# Patient Record
Sex: FEMALE | Race: WHITE | NOT HISPANIC OR LATINO | Employment: UNEMPLOYED | URBAN - METROPOLITAN AREA
[De-identification: names, ages, dates, MRNs, and addresses within clinical notes are randomized per-mention and may not be internally consistent; named-entity substitution may affect disease eponyms.]

---
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
## 2019-10-09 PROBLEM — O36.8190 DECREASED FETAL MOVEMENT: Status: ACTIVE | Noted: 2019-10-09

## 2019-10-27 PROBLEM — O26.90 PREGNANCY COMPLICATION: Status: ACTIVE | Noted: 2019-10-27

## 2019-11-09 PROBLEM — R10.9 ABDOMINAL PAIN DURING PREGNANCY: Status: ACTIVE | Noted: 2019-11-09

## 2019-11-09 PROBLEM — O26.899 ABDOMINAL PAIN DURING PREGNANCY: Status: ACTIVE | Noted: 2019-11-09

## 2019-11-22 VITALS
TEMPERATURE: 98 F | HEART RATE: 84 BPM | OXYGEN SATURATION: 97 % | RESPIRATION RATE: 18 BRPM | DIASTOLIC BLOOD PRESSURE: 57 MMHG | SYSTOLIC BLOOD PRESSURE: 105 MMHG

## 2019-11-22 PROBLEM — R50.9 FEVER: Status: ACTIVE | Noted: 2019-11-22

## 2019-11-23 VITALS
SYSTOLIC BLOOD PRESSURE: 101 MMHG | RESPIRATION RATE: 15 BRPM | TEMPERATURE: 99 F | OXYGEN SATURATION: 96 % | DIASTOLIC BLOOD PRESSURE: 55 MMHG | HEART RATE: 94 BPM

## 2019-11-24 VITALS
DIASTOLIC BLOOD PRESSURE: 58 MMHG | OXYGEN SATURATION: 98 % | RESPIRATION RATE: 14 BRPM | SYSTOLIC BLOOD PRESSURE: 92 MMHG | TEMPERATURE: 98 F | HEART RATE: 66 BPM

## 2019-11-25 VITALS
SYSTOLIC BLOOD PRESSURE: 85 MMHG | RESPIRATION RATE: 18 BRPM | TEMPERATURE: 98 F | DIASTOLIC BLOOD PRESSURE: 59 MMHG | OXYGEN SATURATION: 99 % | HEART RATE: 87 BPM

## 2019-11-25 PROBLEM — R50.9 FEVER: Status: RESOLVED | Noted: 2019-11-22 | Resolved: 2019-11-25

## 2019-12-27 PROBLEM — N89.8 VAGINAL DISCHARGE DURING PREGNANCY: Status: ACTIVE | Noted: 2019-12-27

## 2019-12-27 PROBLEM — O26.899 VAGINAL DISCHARGE DURING PREGNANCY: Status: ACTIVE | Noted: 2019-12-27

## 2019-12-29 PROBLEM — O42.90 PROM (PREMATURE RUPTURE OF MEMBRANES): Status: ACTIVE | Noted: 2019-12-27

## 2020-05-26 PROBLEM — R10.9 ABDOMINAL PAIN DURING PREGNANCY: Status: RESOLVED | Noted: 2019-11-09 | Resolved: 2020-05-26

## 2020-05-26 PROBLEM — F43.21 SITUATIONAL DEPRESSION: Status: RESOLVED | Noted: 2017-09-27 | Resolved: 2020-05-26

## 2020-05-26 PROBLEM — O26.899 ABDOMINAL PAIN DURING PREGNANCY: Status: RESOLVED | Noted: 2019-11-09 | Resolved: 2020-05-26

## 2020-05-26 PROBLEM — O26.90 PREGNANCY COMPLICATION: Status: RESOLVED | Noted: 2019-10-27 | Resolved: 2020-05-26

## 2020-05-26 PROBLEM — O42.90 PROM (PREMATURE RUPTURE OF MEMBRANES): Status: RESOLVED | Noted: 2019-12-27 | Resolved: 2020-05-26

## 2020-05-26 PROBLEM — O36.8190 DECREASED FETAL MOVEMENT: Status: RESOLVED | Noted: 2019-10-09 | Resolved: 2020-05-26

## 2020-12-17 PROBLEM — N76.6 ULCERATION OF VULVA: Status: ACTIVE | Noted: 2019-06-24

## 2020-12-17 PROBLEM — A56.8 CHLAMYDIA TRACHOMATIS INFECTION IN PREGNANCY: Status: ACTIVE | Noted: 2019-06-10

## 2020-12-17 PROBLEM — O98.319 CHLAMYDIA TRACHOMATIS INFECTION IN PREGNANCY: Status: ACTIVE | Noted: 2019-06-10

## 2020-12-17 PROBLEM — O99.810 ABNORMAL GLUCOSE TOLERANCE TEST (GTT) DURING PREGNANCY, ANTEPARTUM: Status: ACTIVE | Noted: 2019-10-29

## 2020-12-17 PROBLEM — Z3A.08 8 WEEKS GESTATION OF PREGNANCY: Status: ACTIVE | Noted: 2020-12-17

## 2020-12-17 PROBLEM — Z98.891 HISTORY OF CESAREAN SECTION: Status: ACTIVE | Noted: 2020-01-06

## 2020-12-17 PROBLEM — O99.019 ANEMIA OF PREGNANCY: Status: ACTIVE | Noted: 2019-10-29

## 2020-12-17 PROBLEM — M62.89 PELVIC FLOOR DYSFUNCTION: Status: ACTIVE | Noted: 2019-08-19

## 2020-12-17 PROBLEM — Z34.90 PREGNANT: Status: ACTIVE | Noted: 2019-05-21

## 2021-04-21 DIAGNOSIS — O36.8390 FETAL ARRHYTHMIA AFFECTING PREGNANCY, ANTEPARTUM: Primary | ICD-10-CM

## 2021-05-03 ENCOUNTER — CLINICAL SUPPORT (OUTPATIENT)
Dept: PEDIATRIC CARDIOLOGY | Facility: CLINIC | Age: 21
End: 2021-05-03
Payer: OTHER GOVERNMENT

## 2021-05-03 ENCOUNTER — OFFICE VISIT (OUTPATIENT)
Dept: PEDIATRIC CARDIOLOGY | Facility: CLINIC | Age: 21
End: 2021-05-03
Payer: OTHER GOVERNMENT

## 2021-05-03 VITALS
HEIGHT: 63 IN | HEART RATE: 86 BPM | BODY MASS INDEX: 23.63 KG/M2 | WEIGHT: 133.38 LBS | DIASTOLIC BLOOD PRESSURE: 62 MMHG | SYSTOLIC BLOOD PRESSURE: 100 MMHG | OXYGEN SATURATION: 99 %

## 2021-05-03 DIAGNOSIS — Z03.73 FETAL ANOMALY SUSPECTED BUT NOT FOUND: Primary | ICD-10-CM

## 2021-05-03 DIAGNOSIS — O36.8390 FETAL ARRHYTHMIA AFFECTING PREGNANCY, ANTEPARTUM: ICD-10-CM

## 2021-05-03 PROCEDURE — 76825 PR  SO2 FETAL HEART: ICD-10-PCS | Mod: 26,S$PBB,, | Performed by: PEDIATRICS

## 2021-05-03 PROCEDURE — 99203 OFFICE O/P NEW LOW 30 MIN: CPT | Mod: 25,S$PBB,, | Performed by: PEDIATRICS

## 2021-05-03 PROCEDURE — 93325 DOPPLER ECHO COLOR FLOW MAPG: CPT | Mod: PBBFAC,PO | Performed by: PEDIATRICS

## 2021-05-03 PROCEDURE — 99999 PR PBB SHADOW E&M-EST. PATIENT-LVL III: ICD-10-PCS | Mod: PBBFAC,,, | Performed by: PEDIATRICS

## 2021-05-03 PROCEDURE — 76825 ECHO EXAM OF FETAL HEART: CPT | Mod: 26,S$PBB,, | Performed by: PEDIATRICS

## 2021-05-03 PROCEDURE — 76827 PR  SO2 FETAL HEART DOPPLER: ICD-10-PCS | Mod: 26,S$PBB,, | Performed by: PEDIATRICS

## 2021-05-03 PROCEDURE — 76827 ECHO EXAM OF FETAL HEART: CPT | Mod: PBBFAC,PO | Performed by: PEDIATRICS

## 2021-05-03 PROCEDURE — 99211 OFF/OP EST MAY X REQ PHY/QHP: CPT | Mod: PBBFAC,27,PO

## 2021-05-03 PROCEDURE — 99999 PR PBB SHADOW E&M-EST. PATIENT-LVL III: CPT | Mod: PBBFAC,,, | Performed by: PEDIATRICS

## 2021-05-03 PROCEDURE — 76827 ECHO EXAM OF FETAL HEART: CPT | Mod: 26,S$PBB,, | Performed by: PEDIATRICS

## 2021-05-03 PROCEDURE — 99999 PR PBB SHADOW E&M-EST. PATIENT-LVL I: ICD-10-PCS | Mod: PBBFAC,,,

## 2021-05-03 PROCEDURE — 99213 OFFICE O/P EST LOW 20 MIN: CPT | Mod: PBBFAC,PO | Performed by: PEDIATRICS

## 2021-05-03 PROCEDURE — 99999 PR PBB SHADOW E&M-EST. PATIENT-LVL I: CPT | Mod: PBBFAC,,,

## 2021-05-03 PROCEDURE — 93325 PR DOPPLER COLOR FLOW VELOCITY MAP: ICD-10-PCS | Mod: 26,S$PBB,, | Performed by: PEDIATRICS

## 2021-05-03 PROCEDURE — 99203 PR OFFICE/OUTPT VISIT, NEW, LEVL III, 30-44 MIN: ICD-10-PCS | Mod: 25,S$PBB,, | Performed by: PEDIATRICS

## 2021-05-03 PROCEDURE — 93325 DOPPLER ECHO COLOR FLOW MAPG: CPT | Mod: 26,S$PBB,, | Performed by: PEDIATRICS

## 2021-05-03 PROCEDURE — 76825 ECHO EXAM OF FETAL HEART: CPT | Mod: PBBFAC,PO | Performed by: PEDIATRICS

## 2021-05-10 ENCOUNTER — PATIENT MESSAGE (OUTPATIENT)
Dept: RESEARCH | Facility: HOSPITAL | Age: 21
End: 2021-05-10

## 2021-06-15 PROBLEM — E16.2 LOW BLOOD GLUCOSE MEASUREMENT: Status: ACTIVE | Noted: 2021-06-15

## 2021-06-23 PROBLEM — O47.9 IRREGULAR UTERINE CONTRACTIONS: Status: ACTIVE | Noted: 2021-06-23

## 2021-07-20 PROBLEM — O36.8190 DECREASED FETAL MOVEMENT: Status: ACTIVE | Noted: 2021-07-20

## 2021-11-29 PROBLEM — Z3A.08 8 WEEKS GESTATION OF PREGNANCY: Status: RESOLVED | Noted: 2020-12-17 | Resolved: 2021-11-29

## 2023-03-25 PROBLEM — O36.8190 DECREASED FETAL MOVEMENT: Status: RESOLVED | Noted: 2021-07-20 | Resolved: 2023-03-25

## 2023-03-25 PROBLEM — Z34.90 PREGNANT: Status: RESOLVED | Noted: 2019-05-21 | Resolved: 2023-03-25

## 2023-03-25 PROBLEM — Z98.891 HISTORY OF CESAREAN SECTION: Status: RESOLVED | Noted: 2020-01-06 | Resolved: 2023-03-25

## 2023-03-25 PROBLEM — O47.9 IRREGULAR UTERINE CONTRACTIONS: Status: RESOLVED | Noted: 2021-06-23 | Resolved: 2023-03-25

## 2023-03-25 PROBLEM — A56.8 CHLAMYDIA TRACHOMATIS INFECTION IN PREGNANCY: Status: RESOLVED | Noted: 2019-06-10 | Resolved: 2023-03-25

## 2023-03-25 PROBLEM — N76.6 ULCERATION OF VULVA: Status: RESOLVED | Noted: 2019-06-24 | Resolved: 2023-03-25

## 2023-03-25 PROBLEM — O99.810 ABNORMAL GLUCOSE TOLERANCE TEST (GTT) DURING PREGNANCY, ANTEPARTUM: Status: RESOLVED | Noted: 2019-10-29 | Resolved: 2023-03-25

## 2023-03-25 PROBLEM — O34.211 MATERNAL CARE DUE TO LOW TRANSVERSE UTERINE SCAR FROM PREVIOUS CESAREAN DELIVERY: Status: ACTIVE | Noted: 2023-03-25

## 2023-03-25 PROBLEM — O98.319 CHLAMYDIA TRACHOMATIS INFECTION IN PREGNANCY: Status: RESOLVED | Noted: 2019-06-10 | Resolved: 2023-03-25

## 2023-03-25 PROBLEM — O99.019 ANEMIA OF PREGNANCY: Status: RESOLVED | Noted: 2019-10-29 | Resolved: 2023-03-25
